# Patient Record
Sex: MALE | Race: ASIAN | NOT HISPANIC OR LATINO
[De-identification: names, ages, dates, MRNs, and addresses within clinical notes are randomized per-mention and may not be internally consistent; named-entity substitution may affect disease eponyms.]

---

## 2018-06-08 ENCOUNTER — APPOINTMENT (OUTPATIENT)
Dept: UROLOGY | Facility: CLINIC | Age: 58
End: 2018-06-08
Payer: MEDICAID

## 2018-06-08 VITALS
DIASTOLIC BLOOD PRESSURE: 75 MMHG | OXYGEN SATURATION: 98 % | SYSTOLIC BLOOD PRESSURE: 119 MMHG | BODY MASS INDEX: 25.52 KG/M2 | TEMPERATURE: 98 F | HEART RATE: 62 BPM | RESPIRATION RATE: 17 BRPM | WEIGHT: 151 LBS

## 2018-06-08 DIAGNOSIS — N20.0 CALCULUS OF KIDNEY: ICD-10-CM

## 2018-06-08 PROCEDURE — 99213 OFFICE O/P EST LOW 20 MIN: CPT

## 2018-06-11 LAB
ANION GAP SERPL CALC-SCNC: 12 MMOL/L
BUN SERPL-MCNC: 12 MG/DL
CALCIUM SERPL-MCNC: 9.1 MG/DL
CHLORIDE SERPL-SCNC: 101 MMOL/L
CO2 SERPL-SCNC: 26 MMOL/L
CREAT SERPL-MCNC: 0.99 MG/DL
GLUCOSE SERPL-MCNC: 87 MG/DL
POTASSIUM SERPL-SCNC: 4.2 MMOL/L
PSA FREE FLD-MCNC: 13.5
PSA FREE SERPL-MCNC: 0.19 NG/ML
PSA SERPL-MCNC: 1.41 NG/ML
SODIUM SERPL-SCNC: 139 MMOL/L

## 2018-06-22 ENCOUNTER — APPOINTMENT (OUTPATIENT)
Dept: UROLOGY | Facility: CLINIC | Age: 58
End: 2018-06-22
Payer: MEDICAID

## 2018-06-22 VITALS
OXYGEN SATURATION: 96 % | WEIGHT: 154 LBS | BODY MASS INDEX: 26.03 KG/M2 | RESPIRATION RATE: 18 BRPM | DIASTOLIC BLOOD PRESSURE: 71 MMHG | TEMPERATURE: 98.1 F | HEART RATE: 78 BPM | SYSTOLIC BLOOD PRESSURE: 120 MMHG

## 2018-06-22 PROCEDURE — 99213 OFFICE O/P EST LOW 20 MIN: CPT

## 2021-06-18 ENCOUNTER — APPOINTMENT (OUTPATIENT)
Dept: UROLOGY | Facility: CLINIC | Age: 61
End: 2021-06-18
Payer: COMMERCIAL

## 2021-06-18 VITALS
SYSTOLIC BLOOD PRESSURE: 115 MMHG | OXYGEN SATURATION: 97 % | WEIGHT: 155 LBS | TEMPERATURE: 97.5 F | DIASTOLIC BLOOD PRESSURE: 75 MMHG | RESPIRATION RATE: 18 BRPM | BODY MASS INDEX: 26.19 KG/M2 | HEART RATE: 72 BPM

## 2021-06-18 PROCEDURE — 99214 OFFICE O/P EST MOD 30 MIN: CPT

## 2021-06-18 RX ORDER — ENEMA 19; 7 G/133ML; G/133ML
7-19 ENEMA RECTAL
Qty: 2 | Refills: 0 | Status: ACTIVE | COMMUNITY
Start: 2021-06-18 | End: 1900-01-01

## 2021-06-19 LAB
ANION GAP SERPL CALC-SCNC: 12 MMOL/L
BUN SERPL-MCNC: 18 MG/DL
CALCIUM SERPL-MCNC: 9.3 MG/DL
CHLORIDE SERPL-SCNC: 100 MMOL/L
CO2 SERPL-SCNC: 28 MMOL/L
CREAT SERPL-MCNC: 1.04 MG/DL
GLUCOSE SERPL-MCNC: 95 MG/DL
POTASSIUM SERPL-SCNC: 4.1 MMOL/L
PSA FREE FLD-MCNC: 18 %
PSA FREE SERPL-MCNC: 0.15 NG/ML
PSA SERPL-MCNC: 0.83 NG/ML
SODIUM SERPL-SCNC: 140 MMOL/L

## 2021-06-20 NOTE — HISTORY OF PRESENT ILLNESS
[FreeTextEntry1] : Please refer to URO Consult note \par \par psa 4.21 \par repeat psa \par prostate mri

## 2021-06-20 NOTE — ADDENDUM
[FreeTextEntry1] : Entered by Arsenio Diaz, acting as scribe for Dr. Cedric Jon.\par \par The documentation recorded by the scribe accurately reflects the service I personally performed and the decisions made by me.

## 2021-06-20 NOTE — LETTER BODY
[FreeTextEntry1] : Lorraine Sun MD\par [address]\[place] [address]\par P [phone]\par F [phone]\par \par Dear Dr. Sun,\par \par Reason for Visit: Previous balanitis. Elevated PSA \par \par This is a 60 year-old gentleman with previous balanitis, presenting with elevated PSA. Patient reports that he has not had previous prostate biopsy. His current PSA is 4.23.  Patient denies any hematuria or lower urinary tract symptoms. He denies any pain. The patient denies any aggravating or relieving factors. The patient denies any interference of function. The patient is entirely asymptomatic. All other review of systems are negative. Past medical history, family history and social history were inquired and were noncontributory to current condition. Medications and allergies were reviewed. He has no known allergies to medication. \par \par On examination, the patient is a healthy-appearing gentleman in no acute distress. He is alert and oriented follows commands. He has normal mood and affect. He is normocephalic. Neck is supple. Oral no thrush. Respirations are unlabored. His abdomen is soft and nontender. Bladder is nonpalpable. No CVA tenderness. Neurologically he is grossly intact. No peripheral edema. Skin without gross abnormality. He has normal male external genitalia. Normal meatus. Bilateral testes are descended intrascrotally and normal to palpation. On rectal examination, there is normal sphincter tone. The prostate is clinically benign without focal induration or nodularity.\par \par His recent PSA was 4.23, which is elevated. \par \par Assessment: Elevated PSA.\par \par I counseled the patient. I discussed with him the risk of occult malignancy. His current PSA is 4.23 which is elevated.I recommended he obtain a prostate MRI. I counseled the patient regarding the procedure. The risks and benefits were discussed. Alternatives were given. I answered the patient questions. The patient will take the necessary preparations for the procedure, including a fleet edema. I also recommended he repeat PSA and BMP to ensure stability. The patient will follow-up as directed and will contact me with any questions or concerns or if he wish to proceed with prostate biopsy. Thank you for the opportunity to participate in the care of Mr. PANDYA. I will keep you updated on his progress. \par \par Plan: Prostate MRI. Fleet edema. PSA. BMP. Follow up as directed.

## 2021-09-01 ENCOUNTER — NON-APPOINTMENT (OUTPATIENT)
Age: 61
End: 2021-09-01

## 2021-12-07 ENCOUNTER — APPOINTMENT (OUTPATIENT)
Dept: UROLOGY | Facility: CLINIC | Age: 61
End: 2021-12-07
Payer: COMMERCIAL

## 2021-12-07 VITALS
DIASTOLIC BLOOD PRESSURE: 64 MMHG | SYSTOLIC BLOOD PRESSURE: 104 MMHG | HEART RATE: 59 BPM | RESPIRATION RATE: 18 BRPM | TEMPERATURE: 97.8 F | OXYGEN SATURATION: 96 %

## 2021-12-07 DIAGNOSIS — R93.429 ABNORMAL RADIOLOGIC FINDINGS ON DIAGNOSITIC IMAGING OF UNSPECIFIED KIDNEY: ICD-10-CM

## 2021-12-07 DIAGNOSIS — R97.20 ELEVATED PROSTATE, SPECIFIC ANTIGEN [PSA]: ICD-10-CM

## 2021-12-07 DIAGNOSIS — N48.1 BALANITIS: ICD-10-CM

## 2021-12-07 LAB
ANION GAP SERPL CALC-SCNC: 12 MMOL/L
BUN SERPL-MCNC: 17 MG/DL
CALCIUM SERPL-MCNC: 9.3 MG/DL
CHLORIDE SERPL-SCNC: 103 MMOL/L
CO2 SERPL-SCNC: 26 MMOL/L
CREAT SERPL-MCNC: 1.04 MG/DL
GLUCOSE SERPL-MCNC: 89 MG/DL
POTASSIUM SERPL-SCNC: 4.3 MMOL/L
SODIUM SERPL-SCNC: 141 MMOL/L

## 2021-12-07 PROCEDURE — 99214 OFFICE O/P EST MOD 30 MIN: CPT

## 2021-12-07 NOTE — LETTER BODY
[FreeTextEntry1] : Lorraine Sun MD\par [address]\[place] [address]\par P [phone]\par F [phone]\par \par Dear Dr. Sun,\par \par Reason for Visit: Balanitis. Elevated PSA \par \par This is a 61 year-old gentleman with elevated PSA and balanitis. Patient reports that he has not had previous prostate biopsy. Patient returns today for follow up. Since he was last seen, his PSA normalized to 0.83.  The patient reports recurrent  balanitis. Patient denies any hematuria or lower urinary tract symptoms. The patient denies any interference of function. All other review of systems are negative. Past medical history, family history and social history were inquired and were noncontributory to current condition. Medications and allergies were reviewed. He has no known allergies to medication. \par \par On examination, the patient is a healthy-appearing gentleman in no acute distress. He is alert and oriented follows commands. He has normal mood and affect. He is normocephalic. Neck is supple. Oral no thrush. Respirations are unlabored. His abdomen is soft and nontender. Bladder is nonpalpable. No CVA tenderness. Neurologically he is grossly intact. No peripheral edema. Skin without gross abnormality. He has normal male external genitalia. Normal meatus. Bilateral testes are descended intrascrotally and normal to palpation. On rectal examination, there is normal sphincter tone. The prostate is clinically benign without focal induration or nodularity.\par \par His BMP in June demonstrated normal renal functions, creatinine 1.04. His PSA was 0.83, which is within normal limits. His previous PSA was 4.23. \par \par Assessment: Elevated PSA.\par \par I counseled the patient. He is doing well. In terms of his elevated PSA, his most recent PSA normalized. I recommended the patient repeat PSA and BMP today to ensure stability. In terms of his balanitis, I recommended the patient continue applying Clotrimazole-Betamethasone cream. I renewed the patient's prescription for Clotrimazole-Betamethasone cream today. I encouraged the patient to continue medications regularly as directed. I encouraged the patient to avoid applying topical alcohol in his foreskin and glands. Risks and alternatives were discussed. I answered the patient questions. The patient will follow-up as directed and will contact me with any questions or concerns. Thank you for the opportunity to participate in the care of Mr. PANDYA. I will keep you updated on his progress. \par \par Plan: Continue applying Clotrimazole-Betamethasone cream.  PSA. BMP. Follow up as directed.

## 2021-12-09 LAB
PSA FREE FLD-MCNC: 22 %
PSA FREE SERPL-MCNC: 0.17 NG/ML
PSA SERPL-MCNC: 0.77 NG/ML

## 2022-06-21 ENCOUNTER — APPOINTMENT (OUTPATIENT)
Dept: UROLOGY | Facility: CLINIC | Age: 62
End: 2022-06-21
Payer: COMMERCIAL

## 2022-06-21 VITALS
OXYGEN SATURATION: 97 % | BODY MASS INDEX: 26.7 KG/M2 | SYSTOLIC BLOOD PRESSURE: 129 MMHG | HEART RATE: 86 BPM | TEMPERATURE: 97.6 F | DIASTOLIC BLOOD PRESSURE: 88 MMHG | RESPIRATION RATE: 18 BRPM | WEIGHT: 158 LBS

## 2022-06-21 PROCEDURE — 99214 OFFICE O/P EST MOD 30 MIN: CPT

## 2022-06-21 NOTE — LETTER BODY
[FreeTextEntry1] : Lorraine Sun MD\par [address]\[place] [address]\par P [phone]\par F [phone]\par \par Dear Dr. Sun,\par \par Reason for Visit: Balanitis. Elevated PSA . \par \par This is a 61 year-old gentleman with elevated PSA and balanitis. The patient reports that he has not had previous prostate biopsy. He returns today for follow up. Since he was last seen, his PSA normalized. The patient reports persistent balanitis. He reports applying Clotrimazole-Betamethasone cream with minimal improvement. The patient denies any hematuria or lower urinary tract symptoms. The patient denies any interference of function. All other review of systems are negative. Past medical history, family history and social history were inquired and were noncontributory to current condition. Medications and allergies were reviewed. He has no known allergies to medication. \par \par On examination, the patient is a healthy-appearing gentleman in no acute distress. He is alert and oriented follows commands. He has normal mood and affect. He is normocephalic. Neck is supple. Oral no thrush. Respirations are unlabored. His abdomen is soft and nontender. Bladder is nonpalpable. No CVA tenderness. Neurologically he is grossly intact. No peripheral edema. Skin without gross abnormality. On rectal examination, there is normal sphincter tone. The prostate is clinically benign without focal induration or nodularity. He has balanitis. \par \par His BMP in December 2021 demonstrated normal renal functions, creatinine 1.04. His PSA was 0.77, which is within normal limits. \par \par Assessment: Elevated PSA. Balanitis. \par \par I counseled the patient. In terms of his elevated PSA, his most recent PSA was normal. In terms of his balanitis, the patient complains of persistent balanitis. I explained that because he is uncircumcised, he is more likely to develop balanitis as a result of improper hygiene compared to circumcised individuals.  I encouraged the patient to consider circumcision. I also recommended the patient continue applying Clotrimazole-Betamethasone cream. I renewed the patient's prescription for Clotrimazole-Betamethasone cream today. I encouraged the patient to continue medications regularly as directed. Risks and alternatives were discussed. I answered the patient questions. The patient will follow-up as directed and will contact me with any questions or concerns. Thank you for the opportunity to participate in the care of Mr. PANDYA. I will keep you updated on his progress. \par \par Plan: Continue applying Clotrimazole-Betamethasone cream. Consider circumcision. Follow up as directed.

## 2022-12-06 ENCOUNTER — APPOINTMENT (OUTPATIENT)
Dept: UROLOGY | Facility: CLINIC | Age: 62
End: 2022-12-06